# Patient Record
(demographics unavailable — no encounter records)

---

## 2025-06-05 NOTE — ASSESSMENT
[FreeTextEntry1] : -Ingrowing toenail  Plan:  -Patient seen and examined. All findings discussed -PNA bilateral borders of hallux left foot; see procedure section -Slantback to lateral border hallux right foot -Instructed to continue dressing the area with bacitracin -RTC PRN

## 2025-06-05 NOTE — END OF VISIT
[] : Resident [Resident] : Resident [FreeTextEntry3] : Presents with bilateral ingrown nails left first toe with erythema at the medial and lateral nail folds tender on palpation Right hallux noted hypertrophy of the lateral nail border.  No erythema [FreeTextEntry2] : Left first toe partial nail avulsion performed to the medial and lateral nail fold under local anesthesia.  Patient tolerated procedure well Postprocedure instructions given Follow-up as needed Patient instructed to call the office if persistent pain and redness

## 2025-06-05 NOTE — HISTORY OF PRESENT ILLNESS
[FreeTextEntry1] : Presents for painful ingrowing toenail left foot after foot was caught in elevator No other pedal complaints today

## 2025-06-05 NOTE — PHYSICAL EXAM
[Delayed in the Right Toes] : capillary refills normal in right toes [Delayed in the Left Toes] : capillary refills normal in the left toes [FreeTextEntry3] : DP/PT pulses palpable [FreeTextEntry1] : Ingrowing toenail to lateral border of hallux left foot Ingrowing nail to bilateral borders of hallux right foot